# Patient Record
Sex: MALE | Race: WHITE | NOT HISPANIC OR LATINO | Employment: OTHER | URBAN - METROPOLITAN AREA
[De-identification: names, ages, dates, MRNs, and addresses within clinical notes are randomized per-mention and may not be internally consistent; named-entity substitution may affect disease eponyms.]

---

## 2024-02-05 ENCOUNTER — LAB (OUTPATIENT)
Dept: URBAN - METROPOLITAN AREA CLINIC 68 | Facility: CLINIC | Age: 59
End: 2024-02-05

## 2024-02-05 ENCOUNTER — OV NP (OUTPATIENT)
Dept: URBAN - METROPOLITAN AREA CLINIC 68 | Facility: CLINIC | Age: 59
End: 2024-02-05
Payer: COMMERCIAL

## 2024-02-05 VITALS
SYSTOLIC BLOOD PRESSURE: 146 MMHG | DIASTOLIC BLOOD PRESSURE: 82 MMHG | WEIGHT: 225 LBS | HEIGHT: 70 IN | BODY MASS INDEX: 32.21 KG/M2

## 2024-02-05 DIAGNOSIS — Z86.010 PERSONAL HISTORY OF COLONIC POLYPS: ICD-10-CM

## 2024-02-05 DIAGNOSIS — R10.31 RIGHT LOWER QUADRANT PAIN: ICD-10-CM

## 2024-02-05 PROCEDURE — 99204 OFFICE O/P NEW MOD 45 MIN: CPT | Performed by: INTERNAL MEDICINE

## 2024-02-05 RX ORDER — TESTOSTERONE CYPIONATE 200 MG/ML
INJECT 0.5ML INTRAMUSCULARLY EVERY 7 DAYS INJECTION, SOLUTION INTRAMUSCULAR
Qty: 4 MILLILITER | Refills: 1 | Status: ACTIVE | COMMUNITY

## 2024-02-05 RX ORDER — ROSUVASTATIN CALCIUM 20 MG/1
TAKE ONE TABLET BY MOUTH AT BEDTIME TABLET, FILM COATED ORAL
Qty: 90 EACH | Refills: 1 | Status: ACTIVE | COMMUNITY

## 2024-02-05 RX ORDER — LISINOPRIL 10 MG/1
TAKE ONE TABLET BY MOUTH DAILY TABLET ORAL
Qty: 90 EACH | Refills: 1 | Status: ACTIVE | COMMUNITY

## 2024-02-05 RX ORDER — SOD SULF/POT CHLORIDE/MAG SULF 1.479 G
12 TABLETS TABLET ORAL
Qty: 24 | Refills: 0 | OUTPATIENT
Start: 2024-02-05 | End: 2024-02-06

## 2024-02-05 RX ORDER — DICYCLOMINE HYDROCHLORIDE 20 MG/1
1 TABLET TABLET ORAL THREE TIMES A DAY
Qty: 90 | OUTPATIENT
Start: 2024-02-05 | End: 2024-03-06

## 2024-02-05 RX ORDER — TADALAFIL 2.5 MG/1
TAKE ONE TABLET BY MOUTH EVERY MORNING TABLET ORAL
Qty: 90 EACH | Refills: 1 | Status: ACTIVE | COMMUNITY

## 2024-02-05 NOTE — HPI-TODAY'S VISIT:
Patient evaluated due to right lower abdominal pain. Referred aproximatelly 4-6 weeks  ago started with intermittet episodes of RLQ abdominal pain. Referred chronic intermittent episodes of diarrhea/constipation. Referred tried probiotics with some improvement.  Denies nausea, vomits, dysphagia, odynophagia, heartburn,  GI bleeding or weight loss

## 2024-02-16 ENCOUNTER — TELEP (OUTPATIENT)
Dept: URBAN - METROPOLITAN AREA TELEHEALTH 1 | Facility: TELEHEALTH | Age: 59
End: 2024-02-16
Payer: COMMERCIAL

## 2024-02-16 VITALS — HEIGHT: 70 IN

## 2024-02-16 DIAGNOSIS — Z86.010 PERSONAL HISTORY OF COLONIC POLYPS: ICD-10-CM

## 2024-02-16 DIAGNOSIS — R19.7 DIARRHEA: ICD-10-CM

## 2024-02-16 DIAGNOSIS — K52.9 ENTERITIS: ICD-10-CM

## 2024-02-16 DIAGNOSIS — R10.31 RIGHT LOWER QUADRANT PAIN: ICD-10-CM

## 2024-02-16 PROCEDURE — 99214 OFFICE O/P EST MOD 30 MIN: CPT

## 2024-02-16 RX ORDER — LISINOPRIL 10 MG/1
TAKE ONE TABLET BY MOUTH DAILY TABLET ORAL
Qty: 90 EACH | Refills: 1 | Status: ACTIVE | COMMUNITY

## 2024-02-16 RX ORDER — DICYCLOMINE HYDROCHLORIDE 20 MG/1
1 TABLET TABLET ORAL THREE TIMES A DAY
Qty: 90 | Status: ACTIVE | COMMUNITY
Start: 2024-02-05 | End: 2024-03-06

## 2024-02-16 RX ORDER — TADALAFIL 2.5 MG/1
TAKE ONE TABLET BY MOUTH EVERY MORNING TABLET ORAL
Qty: 90 EACH | Refills: 1 | Status: ACTIVE | COMMUNITY

## 2024-02-16 RX ORDER — ROSUVASTATIN CALCIUM 20 MG/1
TAKE ONE TABLET BY MOUTH AT BEDTIME TABLET, FILM COATED ORAL
Qty: 90 EACH | Refills: 1 | Status: ACTIVE | COMMUNITY

## 2024-02-16 RX ORDER — TESTOSTERONE CYPIONATE 200 MG/ML
INJECT 0.5ML INTRAMUSCULARLY EVERY 7 DAYS INJECTION, SOLUTION INTRAMUSCULAR
Qty: 4 MILLILITER | Refills: 1 | Status: ACTIVE | COMMUNITY

## 2024-02-16 NOTE — HPI-TODAY'S VISIT:
Patient presents for follow up of prolonged intermittent RLQ pain s/p CT A/P (2/15/24). Refers he continues with RLQ pain; no significant improvement with bentyl. Refers has not made any dietary changes and regularly eats "chip and junk". Refers no fevers, N/V, but does continue with intermittent diarrhea. Refers some improvement with probiotic use. Refers does have upcoming colonoscopy with Dr. Suarez (3/18/24). Denies nausea, vomiting, dysphagia, odynophagia, heartburn, abdominal pain, diarrhea, constipation, hematochezia, melena, or unintentional weight loss

## 2024-02-17 PROBLEM — 64613007: Status: ACTIVE | Noted: 2024-02-17

## 2024-03-12 ENCOUNTER — OV EP (OUTPATIENT)
Dept: URBAN - METROPOLITAN AREA CLINIC 68 | Facility: CLINIC | Age: 59
End: 2024-03-12
Payer: COMMERCIAL

## 2024-03-12 ENCOUNTER — LAB (OUTPATIENT)
Dept: URBAN - METROPOLITAN AREA CLINIC 68 | Facility: CLINIC | Age: 59
End: 2024-03-12

## 2024-03-12 VITALS
HEART RATE: 65 BPM | SYSTOLIC BLOOD PRESSURE: 136 MMHG | BODY MASS INDEX: 31.5 KG/M2 | WEIGHT: 220 LBS | DIASTOLIC BLOOD PRESSURE: 80 MMHG | TEMPERATURE: 97.7 F | HEIGHT: 70 IN | OXYGEN SATURATION: 99 %

## 2024-03-12 DIAGNOSIS — Z86.010 PERSONAL HISTORY OF COLONIC POLYPS: ICD-10-CM

## 2024-03-12 DIAGNOSIS — R93.89 ABNORMAL CT SCAN: ICD-10-CM

## 2024-03-12 DIAGNOSIS — R19.7 DIARRHEA: ICD-10-CM

## 2024-03-12 DIAGNOSIS — R10.31 RIGHT LOWER QUADRANT PAIN: ICD-10-CM

## 2024-03-12 PROBLEM — 129679001: Status: ACTIVE | Noted: 2024-03-12

## 2024-03-12 PROCEDURE — 99213 OFFICE O/P EST LOW 20 MIN: CPT

## 2024-03-12 RX ORDER — TESTOSTERONE CYPIONATE 200 MG/ML
INJECT 0.5ML INTRAMUSCULARLY EVERY 7 DAYS INJECTION, SOLUTION INTRAMUSCULAR
Qty: 4 MILLILITER | Refills: 1 | Status: ACTIVE | COMMUNITY

## 2024-03-12 RX ORDER — ROSUVASTATIN CALCIUM 20 MG/1
TAKE ONE TABLET BY MOUTH AT BEDTIME TABLET, FILM COATED ORAL
Qty: 90 EACH | Refills: 1 | Status: ACTIVE | COMMUNITY

## 2024-03-12 RX ORDER — LISINOPRIL 10 MG/1
TAKE ONE TABLET BY MOUTH DAILY TABLET ORAL
Qty: 90 EACH | Refills: 1 | Status: ACTIVE | COMMUNITY

## 2024-03-12 RX ORDER — TADALAFIL 2.5 MG/1
TAKE ONE TABLET BY MOUTH EVERY MORNING TABLET ORAL
Qty: 90 EACH | Refills: 1 | Status: ACTIVE | COMMUNITY

## 2024-03-12 NOTE — HPI-TODAY'S VISIT:
Patient presents for follow up of prolonged intermittent RLQ pain. Refers he did have CT A/P (2/15/24) with jejunal wall thickening and recently completes stool studies (3/11/24). Refers pain has slowly improved, but he did experiences some recurrent episodes of RLQ pain over the past couple of days. Refers he has a son living ta home who with a hx of addiction and this has been stressful for him Refers his son recently sought addiction treatment, and this had made the pt feel somewhat better with improved symptoms. Refers stools diarrhea stools have improved, and he is pending an upcoming colonoscopy with Dr. Suarez (3/18/24) Refers some "up and down" weight loss of 10 lbs in the past 6 mo. Denies nausea, vomiting, dysphagia, odynophagia, heartburn, abdominal pain, diarrhea, constipation, hematochezia, melena, or unintentional weight loss

## 2024-03-18 ENCOUNTER — COLON (OUTPATIENT)
Dept: URBAN - METROPOLITAN AREA SURGERY CENTER 12 | Facility: SURGERY CENTER | Age: 59
End: 2024-03-18

## 2024-04-17 ENCOUNTER — COLON (OUTPATIENT)
Dept: URBAN - METROPOLITAN AREA SURGERY CENTER 12 | Facility: SURGERY CENTER | Age: 59
End: 2024-04-17
Payer: COMMERCIAL

## 2024-04-17 DIAGNOSIS — K64.8 OTHER HEMORRHOIDS: ICD-10-CM

## 2024-04-17 DIAGNOSIS — Z86.010 PERSONAL HISTORY OF COLONIC POLYPS: ICD-10-CM

## 2024-04-17 PROCEDURE — 45378 DIAGNOSTIC COLONOSCOPY: CPT | Performed by: INTERNAL MEDICINE

## 2024-04-17 RX ORDER — LISINOPRIL 10 MG/1
TAKE ONE TABLET BY MOUTH DAILY TABLET ORAL
Qty: 90 EACH | Refills: 1 | Status: ACTIVE | COMMUNITY

## 2024-04-17 RX ORDER — ROSUVASTATIN CALCIUM 20 MG/1
TAKE ONE TABLET BY MOUTH AT BEDTIME TABLET, FILM COATED ORAL
Qty: 90 EACH | Refills: 1 | Status: ACTIVE | COMMUNITY

## 2024-04-17 RX ORDER — TESTOSTERONE CYPIONATE 200 MG/ML
INJECT 0.5ML INTRAMUSCULARLY EVERY 7 DAYS INJECTION, SOLUTION INTRAMUSCULAR
Qty: 4 MILLILITER | Refills: 1 | Status: ACTIVE | COMMUNITY

## 2024-04-17 RX ORDER — TADALAFIL 2.5 MG/1
TAKE ONE TABLET BY MOUTH EVERY MORNING TABLET ORAL
Qty: 90 EACH | Refills: 1 | Status: ACTIVE | COMMUNITY

## 2024-05-01 ENCOUNTER — OFFICE VISIT (OUTPATIENT)
Dept: URBAN - METROPOLITAN AREA CLINIC 68 | Facility: CLINIC | Age: 59
End: 2024-05-01
Payer: COMMERCIAL

## 2024-05-01 ENCOUNTER — DASHBOARD ENCOUNTERS (OUTPATIENT)
Age: 59
End: 2024-05-01

## 2024-05-01 VITALS
WEIGHT: 220 LBS | HEIGHT: 70 IN | HEART RATE: 66 BPM | BODY MASS INDEX: 31.5 KG/M2 | TEMPERATURE: 97.8 F | DIASTOLIC BLOOD PRESSURE: 80 MMHG | OXYGEN SATURATION: 99 % | SYSTOLIC BLOOD PRESSURE: 130 MMHG

## 2024-05-01 DIAGNOSIS — R10.31 RIGHT LOWER QUADRANT PAIN: ICD-10-CM

## 2024-05-01 DIAGNOSIS — Z86.010 PERSONAL HISTORY OF COLONIC POLYPS: ICD-10-CM

## 2024-05-01 DIAGNOSIS — R93.89 ABNORMAL CT SCAN: ICD-10-CM

## 2024-05-01 DIAGNOSIS — R19.7 DIARRHEA: ICD-10-CM

## 2024-05-01 PROCEDURE — 99213 OFFICE O/P EST LOW 20 MIN: CPT

## 2024-05-01 RX ORDER — TADALAFIL 2.5 MG/1
TAKE ONE TABLET BY MOUTH EVERY MORNING TABLET ORAL
Qty: 90 EACH | Refills: 1 | Status: ACTIVE | COMMUNITY

## 2024-05-01 RX ORDER — LISINOPRIL 10 MG/1
TAKE ONE TABLET BY MOUTH DAILY TABLET ORAL
Qty: 90 EACH | Refills: 1 | Status: ACTIVE | COMMUNITY

## 2024-05-01 RX ORDER — TESTOSTERONE CYPIONATE 200 MG/ML
INJECT 0.5ML INTRAMUSCULARLY EVERY 7 DAYS INJECTION, SOLUTION INTRAMUSCULAR
Qty: 4 MILLILITER | Refills: 1 | Status: ACTIVE | COMMUNITY

## 2024-05-01 RX ORDER — ROSUVASTATIN CALCIUM 20 MG/1
TAKE ONE TABLET BY MOUTH AT BEDTIME TABLET, FILM COATED ORAL
Qty: 90 EACH | Refills: 1 | Status: ACTIVE | COMMUNITY

## 2024-05-01 RX ORDER — FAMOTIDINE 20 MG/1
1 TABLET TABLET, FILM COATED ORAL AT BEDTIME
Qty: 180 | Refills: 1 | OUTPATIENT
Start: 2024-05-09

## 2024-05-02 ENCOUNTER — TELEPHONE ENCOUNTER (OUTPATIENT)
Dept: URBAN - METROPOLITAN AREA CLINIC 68 | Facility: CLINIC | Age: 59
End: 2024-05-02

## 2024-05-09 ENCOUNTER — TELEPHONE ENCOUNTER (OUTPATIENT)
Dept: URBAN - METROPOLITAN AREA CLINIC 68 | Facility: CLINIC | Age: 59
End: 2024-05-09